# Patient Record
Sex: FEMALE | Race: WHITE | NOT HISPANIC OR LATINO | Employment: OTHER | URBAN - METROPOLITAN AREA
[De-identification: names, ages, dates, MRNs, and addresses within clinical notes are randomized per-mention and may not be internally consistent; named-entity substitution may affect disease eponyms.]

---

## 2020-12-07 ENCOUNTER — OFFICE VISIT (OUTPATIENT)
Dept: FAMILY MEDICINE | Facility: CLINIC | Age: 65
End: 2020-12-07
Payer: MEDICAID

## 2020-12-07 VITALS
OXYGEN SATURATION: 97 % | WEIGHT: 237 LBS | HEIGHT: 66 IN | TEMPERATURE: 99 F | SYSTOLIC BLOOD PRESSURE: 124 MMHG | HEART RATE: 78 BPM | DIASTOLIC BLOOD PRESSURE: 82 MMHG | BODY MASS INDEX: 38.09 KG/M2

## 2020-12-07 DIAGNOSIS — M15.9 OSTEOARTHRITIS OF MULTIPLE JOINTS, UNSPECIFIED OSTEOARTHRITIS TYPE: Primary | ICD-10-CM

## 2020-12-07 PROCEDURE — 99203 OFFICE O/P NEW LOW 30 MIN: CPT | Mod: S$GLB,,, | Performed by: NURSE PRACTITIONER

## 2020-12-07 PROCEDURE — 99203 PR OFFICE/OUTPT VISIT, NEW, LEVL III, 30-44 MIN: ICD-10-PCS | Mod: S$GLB,,, | Performed by: NURSE PRACTITIONER

## 2020-12-07 RX ORDER — OMEPRAZOLE 20 MG/1
20 TABLET, DELAYED RELEASE ORAL DAILY
COMMUNITY
End: 2023-02-01 | Stop reason: CLARIF

## 2020-12-07 RX ORDER — SPIRONOLACTONE 100 MG/1
100 TABLET, FILM COATED ORAL DAILY
COMMUNITY
End: 2023-02-04

## 2020-12-07 RX ORDER — IBUPROFEN 200 MG
1 CAPSULE ORAL DAILY
COMMUNITY

## 2020-12-07 RX ORDER — MELOXICAM 15 MG/1
15 TABLET ORAL DAILY
COMMUNITY
End: 2020-12-09 | Stop reason: SDUPTHER

## 2020-12-07 NOTE — PATIENT INSTRUCTIONS
Osteoarthritis  Osteoarthritis (also called degenerative joint disease) happens when the cartilage in a joint becomes damaged and worn. This may be due to age, wear and tear, overuse of the joint, or other problems. Osteoarthritis can affect any joint. But it is most common in hands, knees, spine, hips, and feet. Symptoms include joint stiffness, pain, and swelling.  Home care  · When a joint is more sore than usual, rest it for a day or two.  · Heat can help relieve stiffness. Take a hot bath or apply a heating pad for up to 30 minutes at a time. If symptoms are worse in the morning, using heat just after awakening can help relax the muscle and soothe the joints.   · Ice helps relieve pain and swelling. It is often used after activity. Use a cold pack wrapped in a thin cloth on the joint for 10 to 15 minutes at a time.   · Alternating hot and cold can also help relieve pain. Try this for 20 minutes at a time, several times per day.  · Exercise helps prevent the muscles and ligaments around the joint from becoming weak. It also helps maintain function in the joint.  Be as active as you can. Talk to your healthcare provider about what activity program is best for you.  · Excess weight puts a lot of extra strain on weight-bearing joints of the lower back, hips, knees, feet and ankles. If you are overweight, talk to your healthcare provider about a safe and effective weight loss program.  · Use anti-inflammatory medicines as prescribed for pain. This includes acetaminophen or NSAIDs such as ibuprofen or naproxen. If needed, topical or injected medicines may be recommended. Talk to your healthcare provider if these options are not enough to manage your pain.  · Talk with your healthcare provider about devices that might help improve your function and reduce pain.  Follow-up care  Follow up with your healthcare provider as advised by our staff.  When to seek medical advice  Call your healthcare provider right away if  any of these occur:  · Redness or swelling of a painful joint  · Discharge or pus from a painful joint  · Fever of 100.4ºF (38ºC) or higher, or as directed by your healthcare provider  · Worsening joint pain  · Decreased ability to move the joint or bear weight on the joint  Date Last Reviewed: 3/1/2017  © 2575-0025 [employer]. [address] [place] [address]. All rights reserved. This information is not intended as a substitute for professional medical care. Always follow your healthcare professional's instructions.

## 2020-12-09 ENCOUNTER — HOSPITAL ENCOUNTER (OUTPATIENT)
Dept: RADIOLOGY | Facility: HOSPITAL | Age: 65
Discharge: HOME | End: 2020-12-09
Attending: INTERNAL MEDICINE
Payer: MEDICAID

## 2020-12-09 ENCOUNTER — OFFICE VISIT (OUTPATIENT)
Dept: RHEUMATOLOGY | Facility: CLINIC | Age: 65
End: 2020-12-09
Payer: MEDICAID

## 2020-12-09 VITALS
WEIGHT: 236 LBS | BODY MASS INDEX: 38.68 KG/M2 | SYSTOLIC BLOOD PRESSURE: 154 MMHG | DIASTOLIC BLOOD PRESSURE: 82 MMHG | TEMPERATURE: 97 F

## 2020-12-09 DIAGNOSIS — M15.9 OSTEOARTHRITIS OF MULTIPLE JOINTS, UNSPECIFIED OSTEOARTHRITIS TYPE: ICD-10-CM

## 2020-12-09 DIAGNOSIS — R53.81 PHYSICAL DECONDITIONING: ICD-10-CM

## 2020-12-09 DIAGNOSIS — Z79.899 ENCOUNTER FOR LONG-TERM (CURRENT) DRUG USE: Primary | ICD-10-CM

## 2020-12-09 DIAGNOSIS — E66.9 OBESITY (BMI 35.0-39.9 WITHOUT COMORBIDITY): ICD-10-CM

## 2020-12-09 PROCEDURE — 73560 X-RAY EXAM OF KNEE 1 OR 2: CPT | Mod: TC,50

## 2020-12-09 PROCEDURE — 99204 OFFICE O/P NEW MOD 45 MIN: CPT | Mod: S$GLB,,, | Performed by: INTERNAL MEDICINE

## 2020-12-09 PROCEDURE — 99204 PR OFFICE/OUTPT VISIT, NEW, LEVL IV, 45-59 MIN: ICD-10-PCS | Mod: S$GLB,,, | Performed by: INTERNAL MEDICINE

## 2020-12-09 PROCEDURE — 73521 X-RAY EXAM HIPS BI 2 VIEWS: CPT | Mod: TC

## 2020-12-09 RX ORDER — MAGNESIUM GLUCONATE 27.5 (500)
500 TABLET ORAL DAILY
COMMUNITY

## 2020-12-09 RX ORDER — POTASSIUM CHLORIDE 20 MEQ/1
20 TABLET, EXTENDED RELEASE ORAL EVERY MORNING
Status: ON HOLD | COMMUNITY
End: 2023-02-07 | Stop reason: HOSPADM

## 2020-12-09 RX ORDER — ASPIRIN 81 MG/1
81 TABLET ORAL NIGHTLY
Status: ON HOLD | COMMUNITY
End: 2023-02-07 | Stop reason: SDUPTHER

## 2020-12-09 RX ORDER — MELOXICAM 15 MG/1
15 TABLET ORAL DAILY
Qty: 90 TABLET | Refills: 1 | Status: SHIPPED | OUTPATIENT
Start: 2020-12-09 | End: 2021-09-22 | Stop reason: SDUPTHER

## 2020-12-09 NOTE — PATIENT INSTRUCTIONS
MyPlate Worksheet: 1,600 Calories  Your calorie needs are about 1,600 calories a day. Below are the U.S. Department of Agriculture (USDA) guidelines for your daily recommended amBack Exercise to Keep Fit for Low Back Pain  To help in your recovery and to prevent further back problems, keep your back, abdominal muscles and legs strong. Walk daily as soon as you can. Gradually add other physical activities such as swimming and biking, which can help improve lower back strength. Begin as soon as you can do them comfortably. Do not do any exercises that make your pain a lot worse. The following are some back exercises that can help relieve low back pain.     Pelvic tilt   Repeat 5-10 times, twice per day.  Lie flat on your back (or stand with your back to a wall), knees bent, feet flat on the floor, body relaxed. Tighten your abdominal and buttock muscles, and tilt your pelvis. The curve of the small of your back should flatten towards the floor (or wall). Hold 10 seconds and then relax.       Knee raise     Repeat 5-10 times, twice per day.    Lie flat on your back, knees bent. Bring one knee slowly to your chest. Hug your knee gently. Then lower your leg toward the floor, keeping your knee bent. Do not straighten your legs. Repeat exercise with your other leg.              Partial press-up     Lie face down on a soft, firm surface. Do not turn your head to either side. Rest your arms bent at the elbows alongside your body. Relax for a few minutes. Then raise your upper body enough to lean on your elbows. Relax your lower back and legs as much as possible. Hold this position for 30 seconds at first. Gradually work up to five minutes. Or try slow press-ups. Hold each for five seconds, and repeat five to six times.              Copyright © 2012 by [employer]   Celsa SHEETS, Olman BARBOZA, Isael J, Yvonne B, Scott R, Raymond B, Teja K, Marcelino C, Tip B, Maury S, Juvencio CHATTERJEE, Yamile  "R. [employer]. Adult Acute and Subacute Low Back Pain. Updated November 2012.   ount of each food group.  Vegetables  2 cups Fruits  1½ cups Grains  5 ounces Dairy  3 cups Protein  5 ounces   Eat a variety of vegetables each day.  Aim for these amounts each week:  · 1½ cups dark green vegetables  · 4 cups red or orange-colored vegetables  · 1 cup dry beans and peas  · 4 cups starchy vegetables  · 3½ cups other vegetables Eat a variety of fruits each day.  Go easy on fruit juices.  Good choices of fruits include:  · Berries  · Bananas  · Apples  · Melon  · Dried fruit  · Frozen fruit  · Canned fruit    Choose whole grains whenever you can.  Aim to eat at least 2½ ounces of whole grains each day:  · Bread  · Cereal  · Rice  · Pasta  · Potatoes  · Tortillas Choose low-fat or fat-free milk, yogurt, or cheese each day.  Good choices include:  · Low-fat or fat-free milk or chocolate milk  · Low-fat or fat-free yogurt  · Low-fat or fat-free cottage cheese or other reduced-fat cheeses  · Calcium-fortified milk alternatives Choose low-fat or lean meats, poultry, fish and seafood each day.  Vary your protein. Choose more:  · Fish and other seafood  · Lean low-fat meat and poultry  · Eggs  · Beans, peas  · Tofu  · Unsalted nuts and seeds  Choose less high-fat and red meat.   Source: USDA MyPlate, www.choosemyplate.gov  Know your limits on oils (fats) and sugars:  · Your allowance for oils is 22 grams or 5 teaspoons a day (oil includes vegetable oil, mayonnaise, soft margarine, salad dressing, nuts, olives, avocados, and some fish).  · Limit the extras (solid fats and sugars, also called "empty calories") to 130 calories a day.  · Cut back on salt (sodium). Stay under 2,300 mg sodium a day. If you have a health condition such as heart disease or high blood pressure, your doctor will likely tell you to limit sodium to no more than 1,500 mg a day.  Get moving and be active!  Aim for at least 30 " minutes of physical activity most days of the week or 150 minutes of exercise a week.  MyPlate Servings Worksheet: 1,600 calories  This worksheet tells you how many servings you should get each day from each food group, and tells you how much food makes a serving. Use this as a guide as you plan your meals throughout the day. Track your progress daily by writing in what you actually ate.  Food Group  Daily MyPlate Goal What You Ate Today   Vegetables 4 Half-cups or 4 Servings  One serving is:  ½ cup cut-up raw or cooked vegetables  1 cup raw, leafy vegetables  ½ baked sweet potato  ½ cup vegetable juice  Note: At meals, fill half your plate with vegetables and fruit.     Fruits 3 Half-cups or 3 Servings  One serving is:  ½ cup fresh, frozen, or canned fruit  1 medium piece of fruit  1 cup of berries or melon  ½ cup dried fruit  ½ cup 100% fruit juice  Note: Make most choices fruit instead of juice.     Grains 5 Servings or 5 Ounces  One serving is:  1 slice bread  1 cup dry cereal  ½ cup cooked rice, pasta, or cereal  1 5-inch tortilla  Note: Choose whole grains for at least half of your servings each day.     Dairy 3 Servings or 3 Cups  One serving is:  1 cup milk  1½ ounces reduced-fat hard cheese  2 ounces processed cheese  1 cup low-fat yogurt  1/3 cup shredded cheese  Note: Choose low-fat or fat-free most often.     Protein 5 Servings or 5 Ounces  One serving is:  1 ounce cooked lean beef, pork, lamb, or ham  1 ounce cooked chicken or turkey (no skin)  1 ounce cooked fish or shellfish (not fried)  1 egg  ¼ cup egg substitute  ½ ounce nuts or seeds  1 tablespoon peanut or almond butter  ¼ cup cooked dry beans or peas  ½ cup tofu  2 tablespoons hummus     Date Last Reviewed: 6/1/2015  © 8885-1896 [employer]. [address] [place] [address], [place] [address]. All rights reserved. This information is not intended as a substitute for professional medical care. Always follow your healthcare professional's  instructions.        Osteoarthritis  Osteoarthritis (also called degenerative joint disease) happens when the cartilage in a joint becomes damaged and worn. This may be due to age, wear and tear, overuse of the joint, or other problems. Osteoarthritis can affect any joint. But it is most common in hands, knees, spine, hips, and feet. Symptoms include joint stiffness, pain, and swelling.  Home care  · When a joint is more sore than usual, rest it for a day or two.  · Heat can help relieve stiffness. Take a hot bath or apply a heating pad for up to 30 minutes at a time. If symptoms are worse in the morning, using heat just after awakening can help relax the muscle and soothe the joints.   · Ice helps relieve pain and swelling. It is often used after activity. Use a cold pack wrapped in a thin cloth on the joint for 10 to 15 minutes at a time.   · Alternating hot and cold can also help relieve pain. Try this for 20 minutes at a time, several times per day.  · Exercise helps prevent the muscles and ligaments around the joint from becoming weak. It also helps maintain function in the joint.  Be as active as you can. Talk to your healthcare provider about what activity program is best for you.  · Excess weight puts a lot of extra strain on weight-bearing joints of the lower back, hips, knees, feet and ankles. If you are overweight, talk to your healthcare provider about a safe and effective weight loss program.  · Use anti-inflammatory medicines as prescribed for pain. This includes acetaminophen or NSAIDs such as ibuprofen or naproxen. If needed, topical or injected medicines may be recommended. Talk to your healthcare provider if these options are not enough to manage your pain.  · Talk with your healthcare provider about devices that might help improve your function and reduce pain.  Follow-up care  Follow up with your healthcare provider as advised by our staff.  When to seek medical advice  Call your healthcare  provider right away if any of these occur:  · Redness or swelling of a painful joint  · Discharge or pus from a painful joint  · Fever of 100.4ºF (38ºC) or higher, or as directed by your healthcare provider  · Worsening joint pain  · Decreased ability to move the joint or bear weight on the joint  Date Last Reviewed: 3/1/2017  © 9997-9389 [employer], "2,10E+07". [address] [place] [address]. All rights reserved. This information is not intended as a substitute for professional medical care. Always follow your healthcare professional's instructions.

## 2020-12-09 NOTE — LETTER
December 9, 2020      Davian Sanabria, NP  [address]  [address]  [place] [address]           [facility] - Rheumatology  [address]  [address]  [place] [address]  Phone: [phone]  Fax: [phone]          Patient: Kamilah Xiao   MR Number: [id number]   YOB: 1955   Date of Visit: 12/9/2020       Dear Davian Sanabria:    Thank you for referring Kamilah Xiao to me for evaluation. Attached you will find relevant portions of my assessment and plan of care.    If you have questions, please do not hesitate to call me. I look forward to following Kamilah Xiao along with you.    Sincerely,    Neal Tobar MD    Enclosure  CC:  No Recipients    If you would like to receive this communication electronically, please contact [email or web address] or [phone] to request more information on [employer] access.    For providers and/or their staff who would like to refer a patient to [facility], please contact us through our one-stop-shop provider referral line, [facility], at [phone].    If you feel you have received this communication in error or would no longer like to receive these types of communications, please e-mail [email or web address]

## 2020-12-09 NOTE — PROGRESS NOTES
[facility] RHEUMATOLOGY           New patient visit    Notes dictated to M*Modal. Please forgive any unintentional errors.  Subjective:       Patient ID:   NAME: Kamilah Xiao : 1955     65 y.o. female    Referring Doc: Davian Sanabria*  Other Physicians:    Chief Complaint:  Osteoarthritis    HPI:  This patient treat presents for the evaluation of lower back and hip pain.  This has been present for many years but has gotten progressively worse.  She has noted pain on both sides of the lower back and in the hips and knees.  She has not noted any red, hot, and/or swollen joints.  She does not report typical morning stiffness but does have a degree of gelling.  She was evaluated by Rheumatology 4 years ago in [place].  Consideration of an inflammatory disease led to a trial of high dose steroids (prednisone 60 mg daily); the patient reported no benefit whatsoever from that.  Labs done at that time did not reveal any elevation of acute phase reactants or sero positivity.  She was subsequently placed on anti-inflammatories and has not seen a rheumatologist in more than 3 years.  Her primary care provider has been giving her meloxicam 15 mg daily which is generally very good.  If she has more serious symptoms on a given day, she is to take a Voltaren 75 mg twice daily.  She has been instructed on not mixing the 2 NSAIDs on the same day.    Her past medical history is positive for hypertension.     ROS:   GEN:      no fever, night sweats or weight loss  SKIN:     no rashes, bruising, no Raynauds, no photosensitivity  HEENT:  no acute changes in vision, no mouth ulcers, no sicca symptoms, no scalp tenderness, jaw claudication.  CV:        no CP, PND, PYLE or orthopnea, no palpitations  PULM:   no SOB, cough, hemoptysis, sputum or pleuritic pain  GI:          No dysphagia, + GERD, no hematemesis; no abdominal pain, nausea, vomiting, constipation, diarrhea, melanotic stools, BRBPR.  :        no hematuria,  dysuria  NEURO: no paresthesias, headaches, no tremors  MUSCULOSKELETAL:  As above  PSYCH: No insomnia, no anxiety, no depression    Past Medical/Surgical History:  History reviewed. No pertinent past medical history.  Past Surgical History:   Procedure Laterality Date    CHOLECYSTECTOMY  1999       Allergies:  Review of patient's allergies indicates:  No Known Allergies    Social/Family History:  Social History     Socioeconomic History    Marital status:      Spouse name: Not on file    Number of children: Not on file    Years of education: Not on file    Highest education level: Not on file   Occupational History    Not on file   Social Needs    Financial resource strain: Not on file    Food insecurity     Worry: Not on file     Inability: Not on file    Transportation needs     Medical: Not on file     Non-medical: Not on file   Tobacco Use    Smoking status: Never Smoker    Smokeless tobacco: Never Used   Substance and Sexual Activity    Alcohol use: Never     Frequency: Never    Drug use: Not on file    Sexual activity: Not on file   Lifestyle    Physical activity     Days per week: Not on file     Minutes per session: Not on file    Stress: Not on file   Relationships    Social connections     Talks on phone: Not on file     Gets together: Not on file     Attends [employer] service: Not on file     Active member of club or organization: Not on file     Attends meetings of clubs or organizations: Not on file     Relationship status: Not on file   Other Topics Concern    Not on file   Social History Narrative    Not on file     History reviewed. No pertinent family history.  FAMILY HISTORY: negative for Connective Tissue Disease  There are no end exam questions for this visit.    Medications:    Current Outpatient Medications:     aspirin (ECOTRIN) 81 MG EC tablet, Take 81 mg by mouth., Disp: , Rfl:     calcium carbonate-vitamin D3 (CALCIUM 500 + D, D3,) 500 mg(1,250mg) -125 unit per  tablet, Take 1 tablet by mouth once daily., Disp: , Rfl:     magnesium gluconate 27.5 mg magne- sium (500 mg) Tab, Take 500 mg by mouth., Disp: , Rfl:     meloxicam (MOBIC) 15 MG tablet, Take 1 tablet (15 mg total) by mouth once daily., Disp: 90 tablet, Rfl: 1    omeprazole (PRILOSEC OTC) 20 MG tablet, Take 20 mg by mouth once daily., Disp: , Rfl:     potassium chloride SA (K-DUR,KLOR-CON) 20 MEQ tablet, Take 20 mEq by mouth., Disp: , Rfl:     spironolactone (ALDACTONE) 100 MG tablet, Take 100 mg by mouth once daily., Disp: , Rfl:     Objective:     Vitals:  Blood pressure (!) 154/82, temperature 97.4 °F (36.3 °C), weight 107 kg (236 lb).    Physical Examination:   GEN:     wn/wd female in no apparent distress  SKIN:    no rashes, no sclerodactyly, no Raynaud's, no periungual erythema, no digital tip tapering, no nailbed pitting  HEAD:   no alopecia, no scalp tenderness, no temporal artery tenderness or induration.  EYES:   no conjunctival pallor, no icterus  ENT:     no thrush, no mucosal dryness or ulcerations, adequate oral hygiene & dentition.  NECK:  supple x 5 with moderate right trapezius spasm, no masses, no thyromegaly, no lymphadenopathy.  CV:        S1 and S2, RRR, no murmurs, gallop or rubs  CHEST: Normal respiratory effort;  normal breath sounds/no adventitious sounds. No signs of consolidation.  ABD:      non-tender and non-distended; soft; normal bowel sounds; no rebound, guarding, or tenderness. No hepatosplenomegaly.  Musculoskeletal:  Minor Heberden's and Jenn's nodes are noted in both hands.  There is no evidence of inflammatory arthritis affecting the small joints the hands.  The wrists, elbows, and shoulders are unremarkable.  Examination of the hips demonstrates decreased internal rotation of the right hip and an equivocal Angel's sign on that side.  Left hip is unremarkable.  There is moderate crepitus of the right knee and a 5 degree valgus deformity of the right lower extremity.   No significant metatarsalgia is noted.  Muscle strength is 5/5 x4.  Range of motion of the back is diminished due to some deconditioning and tightness of the hamstrings.  There is moderate paraspinal tenderness noted bilaterally in the lower lumbar region.  No point tenderness over the SI joints is appreciated. Posture is normal.  Gait is normal  EXTREM: no clubbing, cyanosis or edema. normal pulses. Chuy's - x2  NEURO:  grossly intact; motor/sensory WNL; no tremors  PSYCH:  normal mood, affect and behavior    Labs:   No results found for: WBC, HGB, HCT, MCV, PLTCMP@  No results found for: NA, K, CL, CO2, GLU, BUN, CREATININE, CALCIUM, PROT, ALBUMIN, BILITOT, ALKPHOS, AST, ALT, CPK, CRP, ESR, ZAC, RF, URICACID    Radiology/Diagnostic Studies:    None    Assessment/Discussion/Plan:   65 y.o. female with generalized osteoarthritis affecting the lower back and weight-bearing surfaces, exacerbated by obesity and deconditioning    PLAN:  I do not see any evidence of an inflammatory arthritis.  I have discussed the nature of the osteoarthritis diagnosis with her.  I am in agreement with the meloxicam prescription.  I have suggested that rather than using diclofenac on days where the pain is more significant, that she add to the meloxicam up to 3 g of acetaminophen daily as needed (1 g 3 times daily as needed).    I have discussed the need for exercise, particularly stretching, core strengthening, and quadriceps strengthening.  Likewise, diet was discussed and I recommended a 1600 calorie diet, literature on which was provided to her.    I have ordered x-rays of the hips and knees (weight-bearing).    I have also ordered baseline rheumatologic blood testing including RF/CCP and acute phase reactants.    RTC:  I will see her back in 3 months or sooner if needed.            Electronically signed by Neal Tobar MD

## 2020-12-09 NOTE — PROGRESS NOTES
Let the patient know the x-rays showed moderate osteoarthritis.  There is no need for any orthopedic intervention at this time.  Thanks

## 2021-03-09 ENCOUNTER — OFFICE VISIT (OUTPATIENT)
Dept: RHEUMATOLOGY | Facility: CLINIC | Age: 66
End: 2021-03-09
Payer: MEDICAID

## 2021-03-09 VITALS
TEMPERATURE: 98 F | DIASTOLIC BLOOD PRESSURE: 91 MMHG | WEIGHT: 237.13 LBS | SYSTOLIC BLOOD PRESSURE: 160 MMHG | BODY MASS INDEX: 38.86 KG/M2

## 2021-03-09 DIAGNOSIS — Z79.899 ENCOUNTER FOR LONG-TERM (CURRENT) USE OF OTHER MEDICATIONS: Primary | ICD-10-CM

## 2021-03-09 DIAGNOSIS — M19.90 OSTEOARTHRITIS, UNSPECIFIED OSTEOARTHRITIS TYPE, UNSPECIFIED SITE: ICD-10-CM

## 2021-03-09 PROCEDURE — 99213 OFFICE O/P EST LOW 20 MIN: CPT | Mod: S$GLB,,, | Performed by: INTERNAL MEDICINE

## 2021-03-09 PROCEDURE — 99213 PR OFFICE/OUTPT VISIT, EST, LEVL III, 20-29 MIN: ICD-10-PCS | Mod: S$GLB,,, | Performed by: INTERNAL MEDICINE

## 2021-09-22 ENCOUNTER — OFFICE VISIT (OUTPATIENT)
Dept: RHEUMATOLOGY | Facility: CLINIC | Age: 66
End: 2021-09-22
Payer: MEDICAID

## 2021-09-22 VITALS
SYSTOLIC BLOOD PRESSURE: 156 MMHG | DIASTOLIC BLOOD PRESSURE: 96 MMHG | BODY MASS INDEX: 38.23 KG/M2 | WEIGHT: 233.31 LBS

## 2021-09-22 DIAGNOSIS — M15.9 OSTEOARTHRITIS OF MULTIPLE JOINTS, UNSPECIFIED OSTEOARTHRITIS TYPE: Primary | ICD-10-CM

## 2021-09-22 DIAGNOSIS — Z79.899 ENCOUNTER FOR LONG-TERM (CURRENT) USE OF OTHER MEDICATIONS: ICD-10-CM

## 2021-09-22 PROCEDURE — 99213 OFFICE O/P EST LOW 20 MIN: CPT | Mod: S$GLB,,, | Performed by: INTERNAL MEDICINE

## 2021-09-22 PROCEDURE — 99213 PR OFFICE/OUTPT VISIT, EST, LEVL III, 20-29 MIN: ICD-10-PCS | Mod: S$GLB,,, | Performed by: INTERNAL MEDICINE

## 2021-09-22 RX ORDER — MELOXICAM 15 MG/1
15 TABLET ORAL DAILY
Qty: 90 TABLET | Refills: 1 | Status: ON HOLD | OUTPATIENT
Start: 2021-09-22 | End: 2023-02-07 | Stop reason: SDUPTHER

## 2021-09-22 RX ORDER — METHOCARBAMOL 750 MG/1
750 TABLET, FILM COATED ORAL 3 TIMES DAILY PRN
COMMUNITY
Start: 2021-05-20 | End: 2023-02-01 | Stop reason: CLARIF

## 2023-02-04 PROBLEM — Z87.19 HISTORY OF DIVERTICULITIS: Status: ACTIVE | Noted: 2023-02-04

## 2023-02-04 PROBLEM — I25.110 CORONARY ARTERY DISEASE INVOLVING NATIVE CORONARY ARTERY OF NATIVE HEART WITH UNSTABLE ANGINA PECTORIS: Status: ACTIVE | Noted: 2023-02-04

## 2023-02-04 PROBLEM — Z71.89 ACP (ADVANCE CARE PLANNING): Status: ACTIVE | Noted: 2023-02-04

## 2023-02-04 PROBLEM — I10 ESSENTIAL HYPERTENSION: Status: ACTIVE | Noted: 2023-02-04

## 2023-02-04 PROBLEM — K21.9 GASTROESOPHAGEAL REFLUX DISEASE WITHOUT ESOPHAGITIS: Status: ACTIVE | Noted: 2023-02-04

## 2023-02-04 PROBLEM — E66.812 CLASS 2 SEVERE OBESITY DUE TO EXCESS CALORIES WITH SERIOUS COMORBIDITY AND BODY MASS INDEX (BMI) OF 37.0 TO 37.9 IN ADULT: Status: ACTIVE | Noted: 2023-02-04

## 2023-02-04 PROBLEM — E66.01 CLASS 2 SEVERE OBESITY DUE TO EXCESS CALORIES WITH SERIOUS COMORBIDITY AND BODY MASS INDEX (BMI) OF 37.0 TO 37.9 IN ADULT: Status: ACTIVE | Noted: 2023-02-04

## 2023-02-04 PROBLEM — E78.5 HYPERLIPIDEMIA: Status: ACTIVE | Noted: 2023-02-04

## 2023-02-04 PROBLEM — R07.9 CHEST PAIN: Status: ACTIVE | Noted: 2023-02-04

## 2024-06-03 PROBLEM — I20.9 ANGINAL SYNDROME: Status: ACTIVE | Noted: 2024-06-03

## 2024-06-03 PROBLEM — I73.9 PERIPHERAL VASCULAR DISEASE, UNSPECIFIED: Status: ACTIVE | Noted: 2024-06-03

## 2024-06-03 PROBLEM — K94.03 COLOSTOMY MALFUNCTION: Status: ACTIVE | Noted: 2024-06-03

## 2024-07-15 PROBLEM — F32.1 MAJOR DEPRESSIVE DISORDER, SINGLE EPISODE, MODERATE: Status: ACTIVE | Noted: 2024-07-15

## 2024-07-15 PROBLEM — I50.30 DIASTOLIC CONGESTIVE HEART FAILURE, UNSPECIFIED HF CHRONICITY: Status: ACTIVE | Noted: 2024-07-15

## 2024-07-15 PROBLEM — E11.9 TYPE 2 DIABETES MELLITUS WITHOUT COMPLICATION, WITHOUT LONG-TERM CURRENT USE OF INSULIN: Status: ACTIVE | Noted: 2024-07-15

## 2025-01-09 PROBLEM — Z87.19 STATUS POST REPAIR OF RECURRENT VENTRAL HERNIA: Status: ACTIVE | Noted: 2025-01-09

## 2025-01-09 PROBLEM — R07.9 CHEST PAIN: Status: ACTIVE | Noted: 2024-06-03

## 2025-01-09 PROBLEM — Z98.890 STATUS POST REPAIR OF RECURRENT VENTRAL HERNIA: Status: ACTIVE | Noted: 2025-01-09
